# Patient Record
Sex: MALE | Race: WHITE
[De-identification: names, ages, dates, MRNs, and addresses within clinical notes are randomized per-mention and may not be internally consistent; named-entity substitution may affect disease eponyms.]

---

## 2019-06-21 ENCOUNTER — HOSPITAL ENCOUNTER (OUTPATIENT)
Dept: HOSPITAL 97 - ER | Age: 84
Setting detail: OBSERVATION
LOS: 2 days | Discharge: HOME | End: 2019-06-23
Attending: INTERNAL MEDICINE | Admitting: INTERNAL MEDICINE
Payer: COMMERCIAL

## 2019-06-21 VITALS — BODY MASS INDEX: 27.6 KG/M2

## 2019-06-21 DIAGNOSIS — I10: ICD-10-CM

## 2019-06-21 DIAGNOSIS — E78.5: ICD-10-CM

## 2019-06-21 DIAGNOSIS — I25.10: ICD-10-CM

## 2019-06-21 DIAGNOSIS — Z95.5: ICD-10-CM

## 2019-06-21 DIAGNOSIS — N17.9: ICD-10-CM

## 2019-06-21 DIAGNOSIS — S01.81XA: ICD-10-CM

## 2019-06-21 DIAGNOSIS — R55: Primary | ICD-10-CM
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
## 2019-06-21 DIAGNOSIS — Z23: ICD-10-CM
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
## 2019-06-21 LAB
BUN BLD-MCNC: 35 MG/DL (ref 7–18)
GLUCOSE SERPLBLD-MCNC: 143 MG/DL (ref 74–106)
HCT VFR BLD CALC: 39.5 % (ref 39.6–49)
LYMPHOCYTES # SPEC AUTO: 1.5 K/UL (ref 0.7–4.9)
PMV BLD: 7.6 FL (ref 7.6–11.3)
POTASSIUM SERPL-SCNC: 3.4 MMOL/L (ref 3.5–5.1)
RBC # BLD: 4.44 M/UL (ref 4.33–5.43)
TROPONIN (EMERG DEPT USE ONLY): < 0.02 NG/ML (ref 0–0.04)

## 2019-06-21 PROCEDURE — 70450 CT HEAD/BRAIN W/O DYE: CPT

## 2019-06-21 PROCEDURE — 90714 TD VACC NO PRESV 7 YRS+ IM: CPT

## 2019-06-21 PROCEDURE — 96360 HYDRATION IV INFUSION INIT: CPT

## 2019-06-21 PROCEDURE — 93005 ELECTROCARDIOGRAM TRACING: CPT

## 2019-06-21 PROCEDURE — 90471 IMMUNIZATION ADMIN: CPT

## 2019-06-21 PROCEDURE — 86900 BLOOD TYPING SEROLOGIC ABO: CPT

## 2019-06-21 PROCEDURE — 73080 X-RAY EXAM OF ELBOW: CPT

## 2019-06-21 PROCEDURE — 86901 BLOOD TYPING SEROLOGIC RH(D): CPT

## 2019-06-21 PROCEDURE — 72125 CT NECK SPINE W/O DYE: CPT

## 2019-06-21 PROCEDURE — 93880 EXTRACRANIAL BILAT STUDY: CPT

## 2019-06-21 PROCEDURE — 80048 BASIC METABOLIC PNL TOTAL CA: CPT

## 2019-06-21 PROCEDURE — 71250 CT THORAX DX C-: CPT

## 2019-06-21 PROCEDURE — 81003 URINALYSIS AUTO W/O SCOPE: CPT

## 2019-06-21 PROCEDURE — 96361 HYDRATE IV INFUSION ADD-ON: CPT

## 2019-06-21 PROCEDURE — 85025 COMPLETE CBC W/AUTO DIFF WBC: CPT

## 2019-06-21 PROCEDURE — 73502 X-RAY EXAM HIP UNI 2-3 VIEWS: CPT

## 2019-06-21 PROCEDURE — 73110 X-RAY EXAM OF WRIST: CPT

## 2019-06-21 PROCEDURE — 99285 EMERGENCY DEPT VISIT HI MDM: CPT

## 2019-06-21 PROCEDURE — 97163 PT EVAL HIGH COMPLEX 45 MIN: CPT

## 2019-06-21 PROCEDURE — 36415 COLL VENOUS BLD VENIPUNCTURE: CPT

## 2019-06-21 PROCEDURE — 0JQ10ZZ REPAIR FACE SUBCUTANEOUS TISSUE AND FASCIA, OPEN APPROACH: ICD-10-PCS

## 2019-06-21 PROCEDURE — 86850 RBC ANTIBODY SCREEN: CPT

## 2019-06-21 PROCEDURE — 12011 RPR F/E/E/N/L/M 2.5 CM/<: CPT

## 2019-06-21 PROCEDURE — 84484 ASSAY OF TROPONIN QUANT: CPT

## 2019-06-21 NOTE — P.HP
Certification for Inpatient


Patient admitted to: Observation


With expected LOS: <2 Midnights


Practitioner: I am a practitioner with admitting privileges, knowledge of 

patient current condition, hospital course, and medical plan of care.


Services: Services provided to patient in accordance with Admission 

requirements found in Title 42 Section 412.3 of the Code of Federal Regulations





Patient History


Date of Service: 06/21/19


Reason for admission: syncope, fall


History of Present Illness: 





Mr Jurado is an 87 years old male with history of CAD, HTN, dyslipidemia, PVD, 

BPH, who [place] today. He was trying to get 

popcorn out of the cabinet, when suddenly he fell and hit his left side of the 

head to the ground. The patient denied dizziness or palpitation prior to fall. 

He did not loss his conscious, but he does not remember how did it happened. He 

denied chest pain or SOB. Wife and son said that he is not very steady in his 

feet, and he may tripped. EKG shows SR at 65 bpm, trop I negative. CT head/

cervical/chest/abd/pelvis showed no acute abnormalities. EKG SR at 65 with T 

wave inversion on inferior leads.


Home medications list reviewed: Yes





- Past Medical/Surgical History


-: Hypertension; Atrial Fib; Glaucoma; BPH; Depression; High Cholesterol


-: cardiac stents; back surgery; Knee surgery; shoulder surgery; Carpal Tunnel





- Family History


Family History: Reviewed- Non-Contributory





- Social History


Smoking Status: Former smoker


Alcohol use: Yes


CD- Drugs: No


Place of Residence: Home





Review of Systems


10-point ROS is otherwise unremarkable





Physical Examination





- Physical Exam


General: Alert, In no apparent distress, Oriented x3


HEENT: PERRLA, Mucous membr. moist/pink, Other (left periorbital hematoma), EOMI

, Sclerae nonicteric


Neck: Supple, 2+ carotid pulse no bruit, No LAD, Without JVD or thyroid 

abnormality


Respiratory: Clear to auscultation bilaterally, Normal air movement


Cardiovascular: Regular rate/rhythm, Normal S1 S2


Gastrointestinal: Normal bowel sounds, No tenderness


Musculoskeletal: No tenderness


Integumentary: No rashes, Skin lesion (left arm)


Neurological: Normal speech, Normal strength at 5/5 x4 extr, Normal tone, 

Normal affect


Lymphatics: No axilla or inguinal lymphadenopathy





- Studies


Laboratory Data (last 24 hrs)





06/21/19 21:20: Creatinine 1.59 H


06/21/19 21:20: WBC 8.9, Hgb 13.1 L, Hct 39.5 L, Plt Count 223


06/21/19 21:20: Sodium 139, Potassium 3.4 L, BUN 35 H, Creatinine 1.65 H, 

Glucose 143 H








Assessment and Plan





- Problems (Diagnosis)


(1) Syncope


Current Visit: Yes   Status: Acute   


Qualifiers: 


   Syncope type: unspecified   Qualified Code(s): R55 - Syncope and collapse   





(2) HTN (hypertension)


Current Visit: Yes   Status: Acute   


Qualifiers: 


   Hypertension type: essential hypertension   Qualified Code(s): I10 - 

Essential (primary) hypertension   





(3) CAD (coronary artery disease)


Current Visit: Yes   Status: Acute   


Qualifiers: 


   Coronary Disease-Associated Artery/Lesion type: native artery   Native vs. 

transplanted heart: native heart   Associated angina: without angina   

Qualified Code(s): I25.10 - Atherosclerotic heart disease of native coronary 

artery without angina pectoris   





(4) Acute renal injury


Current Visit: Yes   Status: Acute   





- Plan





will admit the patient under observation due to a fall at home. Differential 

diagnosis include syncope vs tripped and fall. Will order continue cardiac 

monitor, ECHO. Consult PT for evaluation and recommendations.





- Advance Directives


Does patient have a Living Will: No


Does patient have a Durable POA for Healthcare: No





- Code Status/Comfort Care


Code Status Assessed: Yes


Code Status: Full Code

## 2019-06-21 NOTE — ER
Nurse's Notes                                                                                     

 [facility]                                                                 

Name: Yvonne Jurado                                                                                

Age: 87 yrs                                                                                       

Sex: Male                                                                                         

: 1931                                                                                   

MRN: [id number]                                                                                   

Arrival Date: 2019                                                                          

Time: 20:51                                                                                       

Account#: [id number]                                                                            

Bed 6                                                                                             

Private MD:                                                                                       

Diagnosis: Syncope and collapse                                                                   

                                                                                                  

Presentation:                                                                                     

                                                                                             

20:53 Presenting complaint: Patient states: I was getting popcorn out of the cabinet and all  tl1 

      of a sudden I tripped over my feet and hit the left side of my head on the floor and        

      also hit my left elbow, left wrist and left hip. Care prior to arrival: dressing            

      applied to wound to left forehead and left elbow. Mechanism of Injury: Fall. Trauma         

      event details: Injury occurred in the [facility], Injury occurred: at home.         

      Injury occurred: 2019 Injury occurred at: 19:30.                                   

20:53 Acuity: MARSHA 2                                                                           tl1 

20:53 Method Of Arrival: EMS: [facility]                                                    tl1 

23:47 Transition of care: patient was not received from another setting of care. Onset of     tl1 

      symptoms was 2019. Risk Assessment: Do you want to hurt yourself or someone        

      else? Patient reports no desire to harm self or others. Initial Sepsis Screen: Does the     

      patient meet any 2 criteria? No. Patient's initial sepsis screen is negative. Does the      

      patient have a suspected source of infection? No. Patient's initial sepsis screen is        

      negative.                                                                                   

                                                                                                  

Triage Assessment:                                                                                

21:02 General: Appears in no apparent distress. Behavior is calm, cooperative, appropriate    tl1 

      for age. Pain: Complains of pain in left temple, left elbow, left wrist, left hip Pain      

      currently is 4 out of 10 on a pain scale. Quality of pain is described as aching.           

      Neuro: Level of Consciousness is awake, alert, obeys commands, Oriented to person,          

      place, time, situation,  are equal bilaterally Speech is normal, Pupils are            

      PERRLA. Cardiovascular: Denies chest pain. Respiratory: Airway is patent Trachea            

      midline Respiratory effort is even, unlabored, Respiratory pattern is regular,              

      symmetrical, Breath sounds are clear bilaterally. GI: Abdomen is non-distended, Bowel       

      sounds present X 4 quads. Abd is soft and non tender X 4 quads. : No signs and/or         

      symptoms were reported regarding the genitourinary system. Derm: Skin is thin, Skin is      

      pink, warm \T\ dry. Skin temperature is warm. Musculoskeletal: Tenderness present in left   

      hip Reports pain in left hip. Injury Description: Head injury sustained to left temple      

      is open, bleeding, did not have loss of consciousness, was sustained 1-2 hours ago.         

      Laceration sustained to left temple is clean, 2.6 to 7.5 cm long, was sustained 1-2         

      hours ago. moderate bleeding noted at this time. skin tear noted to left elbow and left     

      wrist.                                                                                      

                                                                                                  

Trauma Activation: Alert                                                                          

 Physician: ED Physician; Name: Dr Gurrola; Notified At:  20:43; Arrived At:            

   20:43                                                                                

 Physician: General Surgeon; Name: ; Notified At:  20:43; Arrived At:                   

 Physician: Radiology; Name: Wayne Contreras Lindsy; Notified At:  20:43;              

  Arrived At:  20:45                                                                    

 Physician: Respiratory; Name: ; Notified At:  20:43; Arrived At:                       

 Physician: Lab; Name: ; Notified At:  20:43; Arrived At:                               

                                                                                                  

Historical:                                                                                       

- Allergies:                                                                                      

21:02 No Known Allergies;                                                                     tl1 

- Home Meds:                                                                                      

21:17 amlodipine 10 mg tab 1 tab once daily [Active]; atorvastatin 80 mg oral tab 1 tab once  aa1 

      daily [Active]; Plavix 75 mg Oral tab 1 tab once daily [Active]; escitalopram oxalate 5     

      mg oral tab 1 tab once daily [Active]; finasteride 5 mg oral tab 1 tab once daily           

      [Active]; furosemide 40 mg Oral tab 1 tab 2 times per day [Active]; pantoprazole 40 mg      

      oral TbEC 1 tab once daily [Active]; propafenone 225 mg Oral tab 1 tab every 8 hours        

      [Active]; Combigan 0.2-0.5 % ophthalmic drop 2 drop three times a day [Active];             

      tamsulosin 0.4 mg oral cp24 1 cap once daily [Active]; isosorbide mononitrate 60 mg         

      Oral Tb24 1 tab once daily [Active]; aspirin 81 mg Oral TbEC 1 tab once daily [Active];     

      Iron CR Oral 65 mg daily [Active]; omega-3 fatty acids 1,000 mg oral cap twice a day        

      [Active];                                                                                   

- PMHx:                                                                                           

21:02 Hypertension; Atrial Fib; Glaucoma; BPH; Depression; High Cholesterol;                  tl1 

- PSHx:                                                                                           

21:02 cardiac stents; back surgery; Knee surgery; shoulder surgery; Carpal Tunnel Repair;     tl1 

      finger amputation;                                                                          

                                                                                                  

- Immunization history: Last tetanus immunization: unknown.                                       

- Social history:: Patient uses alcohol, Patient/guardian denies using street drugs, IV           

  drugs, The patient lives with family, Smoking status: Patient/guardian denies using             

  tobacco, the patient reports quitting approximately 30 years ago.                               

- Family history:: not pertinent.                                                                 

- Ebola Screening: : Patient negative for fever greater than or equal to 101.5 degrees            

  Fahrenheit, and additional compatible Ebola Virus Disease symptoms Patient denies               

  exposure to infectious person Patient denies travel to an Ebola-affected area in the            

  21 days before illness onset.                                                                   

- Social history: Uses alcohol, glass of wine daily. Denies using tobacco products.               

                                                                                                  

                                                                                                  

Screenin:26 Abuse screen: Denies threats or abuse. Tuberculosis screening: No symptoms or risk      tl1 

      factors identified.                                                                         

23:47 Nutritional screening: No deficits noted. Fall Risk Fall in past 12 months (25 points). tl1 

      IV access (20 points).                                                                      

                                                                                                  

Primary Survey:                                                                                   

21:07 NO uncontrolled hemorrhage observed. A: The patient is alert. Airway: patent.           tl1 

      Breathing/Chest: Respiratory pattern: regular, Respiratory effort: spontaneous,             

      unlabored, Breath sounds: clear, bilaterally. Circulation: Pulses: palpable . Skin          

      color: pink, Skin temperature: warm. Disability Alert. Exposure/Environment: All            

      clothing and personal items were removed. Forensic evidence collection is not deemed to     

      be indicated at this time. Items placed in patient belonging bag. There is no evidence      

      of uncontrolled external bleeding. Obvious injury(ies) are noted at this time:              

      laceration noted to left temple, skin tear noted to left wrist and left elbow.              

      Reassessment Airway Airway Patent Breathing/Chest Respiratory pattern Regular               

      Respiratory effort Spontaneous Unlabored Breath sounds Clear Chest inspection               

      Symmetrical Circulation Color Pink Temperature Warm Dry Disability Alert.                   

                                                                                                  

Secondary Survey:                                                                                 

21:25 HEENT: Head Other laceration to left temple Face No injury/deformity Eyes: No injury or tl1 

      deformity noted. to bilateral eyes. Ears: clear bilaterally. Nose: clear to bilateral       

      nares. Throat: No injury or deformity noted. Gastrointestinal: No deficits noted. :       

      No deficits noted. Musculoskeletal: Tenderness present in left hip Reports pain in left     

      hip. Injury Description: Head injury sustained to left temple is open, bleeding, did        

      not have loss of consciousness, was sustained 1-2 hours ago. Laceration sustained to        

      left temple.                                                                                

                                                                                                  

Assessment:                                                                                       

21:52 Reassessment: see triage assessment.                                                    tl1 

23:25 Reassessment: Patient and/or family updated on plan of care and expected duration. Pain tl1 

      level reassessed. Patient is alert, oriented x 3, equal unlabored respirations, skin        

      warm/dry/pink. Patient states feeling better. Patient states symptoms have improved.        

      General: Appears in no apparent distress. comfortable, Behavior is calm, cooperative,       

      appropriate for age. Pain: Denies pain. Neuro: Level of Consciousness is awake, alert,      

      obeys commands, Oriented to person, place, time, situation, Speech is normal, Pupils        

      are PERRLA. Cardiovascular: Denies chest pain. Respiratory: Airway is patent Trachea        

      midline Respiratory effort is even, unlabored, Breath sounds are clear bilaterally. GI:     

      Abdomen is non-distended, Bowel sounds present X 4 quads. : No signs and/or symptoms      

      were reported regarding the genitourinary system. Derm: Bruising that is dark purple,       

      on left eye and left temple.                                                                

                                                                                                  

Vital Signs:                                                                                      

20:57  / 82; Pulse 87; Resp 17; Temp 98.1(O); Pulse Ox 100% ; Weight 87.09 kg; Height 5 tl1 

      ft. 10 in. (177.80 cm); Pain 2/10;                                                          

23:09  / 61; Pulse 67; Resp 16; Pulse Ox 99% ; Pain 2/10;                               tl1 

23:45  / 72; Pulse 82; Resp 16; Temp 98.2; Pulse Ox 100% on R/A; Pain 5/10;             tl1 

20:57 Body Mass Index 27.55 (87.09 kg, 177.80 cm)                                             tl1 

                                                                                                  

[place] Coma Score:                                                                               

20:57 Eye Response: spontaneous(4). Verbal Response: oriented(5). Motor Response: obeys       tl1 

      commands(6). Total: 15.                                                                     

22:00 Eye Response: spontaneous(4). Verbal Response: oriented(5). Motor Response: obeys       tl1 

      commands(6). Total: 15.                                                                     

23:46 Eye Response: spontaneous(4). Verbal Response: oriented(5). Motor Response: obeys       tl1 

      commands(6). Total: 15.                                                                     

                                                                                                  

Trauma Score (Adult):                                                                             

20:57 Eye Response: spontaneous(1); Verbal Response: oriented(1); Motor Response: obeys       tl1 

      commands(2); Systolic BP: > 89 mm Hg(4); Respiratory Rate: 10 to 29 per min(4); [place]     

      Score: 15; Trauma Score: 12                                                                 

22:00 Eye Response: spontaneous(1); Verbal Response: oriented(1); Motor Response: obeys       tl1 

      commands(2); Systolic BP: > 89 mm Hg(4); Respiratory Rate: 10 to 29 per min(4); [place]     

      Score: 15; Trauma Score: 12                                                                 

23:46 Eye Response: spontaneous(1); Verbal Response: oriented(1); Motor Response: obeys       tl1 

      commands(2); Systolic BP: > 89 mm Hg(4); Respiratory Rate: 10 to 29 per min(4); [place]     

      Score: 15; Trauma Score: 12                                                                 

                                                                                                  

ED Course:                                                                                        

20:51 Patient arrived in ED.                                                                  tl1 

20:52 Dejah Parry, NAOMIE is Primary Nurse.                                                    tl1 

20:57 Triage completed.                                                                       tl1 

21:06 Isela Gurrola MD is Attending Physician.                                           ma2 

21:06 Arm band placed on right wrist.                                                         tl1 

21:06 Patient has correct armband on for positive identification. Placed in gown. Bed in low  tl1 

      position. Call light in reach. Side rails up X 1. Adult w/ patient. Pulse ox on. NIBP       

      on. Warm blanket given.                                                                     

21:32 Inserted saline lock: 20 gauge in right wrist, using aseptic technique. Blood collected.ag4 

21:50 Assist provider with laceration repair on left temple that was between 2.6 to 7.5 cm    tl1 

      using sutures. Set up tray. Performed by Isela Gurrola MD Dressed with 4X4s, Christie.      

21:53 Elbow Left 3 View XRAY In Process Unspecified.                                          EDMS

21:53 Wrist Left (3 View) XRAY In Process Unspecified.                                        EDMS

21:54 Hip Left 2 View In Process Unspecified.                                                 EDMS

22:00 CT Traumagram (Head C Spine CAP wo con) In Process Unspecified.                         EDMS

23:07 Wound care: to skin tear to left elbow located on left elbow was cleaned with           tl1 

      Hibiclens, irrigated with normal saline, dressed with 4X4s, steri strips.                   

23:24 Teresita Chavez MD is Hospitalizing Provider.                                          ma2 

23:48 Patient maintains SpO2 saturation greater than 95% on room air. Thermoregulation: warm  tl1 

      blanket given to patient.                                                                   

                                                                                             

00:09 Patient admitted, IV remains in place.                                                  tl1 

                                                                                                  

Administered Medications:                                                                         

                                                                                             

21:15 Drug: Tetanus-Diphtheria Toxoid Adult 0.5 ml {: [employer]. Exp:         tl1 

      2021. Lot #: [id number]. } Route: IM; Site: left deltoid;                                 

23:28 Follow up: Response: No adverse reaction; No change in condition                        tl1 

22:23 Drug: NS 0.9% 1000 ml Route: IV; Rate: 1 bolus; Site: left forearm;                     tl1 

                                                                                             

00:07 Follow up: IV Status: Completed infusion                                                tl1 

                                                                                             

22:23 Drug: Potassium Chloride 40 mEq Route: PO;                                              tl1 

23:28 Follow up: Response: No adverse reaction; No change in condition                        tl1 

23:43 Drug: traMADol 50 mg Route: PO;                                                         tl1 

                                                                                             

00:07 Follow up: Response: No adverse reaction; No change in condition; Other; medication     tl1 

      administerde just prior to admit                                                            

                                                                                                  

                                                                                                  

Intake:                                                                                           

                                                                                             

23:53 IV: 1000ml; Total: 1000ml.                                                              tl1 

                                                                                                  

Output:                                                                                           

23:53 Urine: 400ml (Voided); Total: 400ml.                                                    tl1 

                                                                                                  

Outcome:                                                                                          

23:25 Decision to Hospitalize by Provider.                                                    ma2 

23:48 Patient's length of stay in the Emergency Department was greater than 2 hours. Patient  tl1 

      being admittedPatient's length of stay extended due to                                      

                                                                                             

00:08 Admitted to Tele accompanied by tech, via stretcher, with chart, Report called to       tl1 

      Keysha AKBAR                                                                                  

      Condition: stable                                                                           

      Instructed on the need for admit.                                                           

00:36 Patient left the ED.                                                                    tl1 

                                                                                                  

Signatures:                                                                                       

Dispatcher [employer]                           EDMS                                                 

Kathi Jane RN                  RN   aa1                                                  

Lakesha Mckeon RN                     RN   bb                                                   

Dejah Parry RN                      RN   tl1                                                  

Isela Gurrola MD MD ma2                                                  

Denzel Llanos                                 ag4                                                  

                                                                                                  

Corrections: (The following items were deleted from the chart)                                    

                                                                                             

21:10 20:53 Trauma Activation: Alert tl1                                                      bb  

23:53 23:23 IV 1000, Intake Total 1000; Urine 400, (Voided), Output Total 400. tl1            tl1 

                                                                                                  

**************************************************************************************************

## 2019-06-21 NOTE — EDPHYS
Physician Documentation                                                                           

 [facility]                                                                 

Name: Yvonne Jurado                                                                                

Age: 87 yrs                                                                                       

Sex: Male                                                                                         

: 1931                                                                                   

MRN: [id number]                                                                                   

Arrival Date: 2019                                                                          

Time: 20:51                                                                                       

Account#: [id number]                                                                            

Bed 6                                                                                             

Private MD:                                                                                       

ED Physician Isela Gurrola                                                                    

HPI:                                                                                              

                                                                                             

23:19 This 87 yrs old  Male presents to ER via EMS with complaints of Fall Injury,   ma2 

      Head Injury Without LOC-Adult.                                                              

23:20 Details of fall: The patient fell from a supine position. Onset: The symptoms/episode   ma2 

      began/occurred suddenly, 1 day(s) ago. Associated injuries: The patient sustained           

      injury to the head. Severity of symptoms: At their worst the symptoms were mild, in the     

      emergency department the symptoms are unchanged. The patient has not experienced            

      similar symptoms in the past. ?syncope, declined pain medicine in er .                      

                                                                                                  

Historical:                                                                                       

- Allergies:                                                                                      

21:02 No Known Allergies;                                                                     tl1 

- Home Meds:                                                                                      

21:17 amlodipine 10 mg tab 1 tab once daily [Active]; atorvastatin 80 mg oral tab 1 tab once  aa1 

      daily [Active]; Plavix 75 mg Oral tab 1 tab once daily [Active]; escitalopram oxalate 5     

      mg oral tab 1 tab once daily [Active]; finasteride 5 mg oral tab 1 tab once daily           

      [Active]; furosemide 40 mg Oral tab 1 tab 2 times per day [Active]; pantoprazole 40 mg      

      oral TbEC 1 tab once daily [Active]; propafenone 225 mg Oral tab 1 tab every 8 hours        

      [Active]; Combigan 0.2-0.5 % ophthalmic drop 2 drop three times a day [Active];             

      tamsulosin 0.4 mg oral cp24 1 cap once daily [Active]; isosorbide mononitrate 60 mg         

      Oral Tb24 1 tab once daily [Active]; aspirin 81 mg Oral TbEC 1 tab once daily [Active];     

      Iron CR Oral 65 mg daily [Active]; omega-3 fatty acids 1,000 mg oral cap twice a day        

      [Active];                                                                                   

- PMHx:                                                                                           

21:02 Hypertension; Atrial Fib; Glaucoma; BPH; Depression; High Cholesterol;                  tl1 

- PSHx:                                                                                           

21:02 cardiac stents; back surgery; Knee surgery; shoulder surgery; Carpal Tunnel Repair;     tl1 

      finger amputation;                                                                          

                                                                                                  

- Immunization history: Last tetanus immunization: unknown.                                       

- Social history:: Patient uses alcohol, Patient/guardian denies using street drugs, IV           

  drugs, The patient lives with family, Smoking status: Patient/guardian denies using             

  tobacco, the patient reports quitting approximately 30 years ago.                               

- Family history:: not pertinent.                                                                 

- Ebola Screening: : Patient negative for fever greater than or equal to 101.5 degrees            

  Fahrenheit, and additional compatible Ebola Virus Disease symptoms Patient denies               

  exposure to infectious person Patient denies travel to an Ebola-affected area in the            

  21 days before illness onset.                                                                   

- Social history: Uses alcohol, glass of wine daily. Denies using tobacco products.               

                                                                                                  

                                                                                                  

ROS:                                                                                              

23:20 Constitutional: Negative for fever, chills, and weight loss, Cardiovascular: Negative   ma2 

      for chest pain, palpitations, and edema, Respiratory: Negative for shortness of breath,     

      cough, wheezing, and pleuritic chest pain, Abdomen/GI: Negative for abdominal pain,         

      nausea, diarrhea, and constipation, Back: Negative for injury and pain, : Negative        

      for injury, bleeding, discharge, and swelling, MS/Extremity: Negative for injury and        

      deformity, Skin: Negative for injury, rash, and discoloration, Neuro: Negative for          

      headache, weakness, numbness, tingling, and seizure.                                        

                                                                                                  

Exam:                                                                                             

23:20 Constitutional:  This is a well developed, well nourished patient who is awake, alert,  ma2 

      and in no acute distress. Chest/axilla:  Normal chest wall appearance and motion.           

      Nontender with no deformity.  No lesions are appreciated. Cardiovascular:  Regular rate     

      and rhythm with a normal S1 and S2.  No gallops, murmurs, or rubs.  Normal PMI, no JVD.     

       No pulse deficits. Respiratory:  Lungs have equal breath sounds bilaterally, clear to      

      auscultation and percussion.  No rales, rhonchi or wheezes noted.  No increased work of     

      breathing, no retractions or nasal flaring. Abdomen/GI:  Soft, non-tender, with normal      

      bowel sounds.  No distension or tympany.  No guarding or rebound.  No evidence of           

      tenderness throughout.                                                                      

23:20 MS/ Extremity:  Pulses equal, no cyanosis.  Neurovascular intact.  Full, normal range       

      of motion. Neuro:  Awake and alert, GCS 15, oriented to person, place, time, and            

      situation.  Cranial nerves II-XII grossly intact.  Motor strength 5/5 in all                

      extremities.  Sensory grossly intact.  Cerebellar exam normal.  Normal gait.                

23:20 Head/face: Noted is contusion, laceration.                                                  

23:20 Musculoskeletal/extremity: left elbow abrasion with skin loss .                         ma2 

                                                                                                  

Vital Signs:                                                                                      

20:57  / 82; Pulse 87; Resp 17; Temp 98.1(O); Pulse Ox 100% ; Weight 87.09 kg; Height 5 tl1 

      ft. 10 in. (177.80 cm); Pain 2/10;                                                          

23:09  / 61; Pulse 67; Resp 16; Pulse Ox 99% ; Pain 2/10;                               tl1 

23:45  / 72; Pulse 82; Resp 16; Temp 98.2; Pulse Ox 100% on R/A; Pain 5/10;             tl1 

20:57 Body Mass Index 27.55 (87.09 kg, 177.80 cm)                                             tl1 

                                                                                                  

Leland Coma Score:                                                                               

20:57 Eye Response: spontaneous(4). Verbal Response: oriented(5). Motor Response: obeys       tl1 

      commands(6). Total: 15.                                                                     

22:00 Eye Response: spontaneous(4). Verbal Response: oriented(5). Motor Response: obeys       tl1 

      commands(6). Total: 15.                                                                     

23:46 Eye Response: spontaneous(4). Verbal Response: oriented(5). Motor Response: obeys       tl1 

      commands(6). Total: 15.                                                                     

                                                                                                  

Trauma Score (Adult):                                                                             

20:57 Eye Response: spontaneous(1); Verbal Response: oriented(1); Motor Response: obeys       tl1 

      commands(2); Systolic BP: > 89 mm Hg(4); Respiratory Rate: 10 to 29 per min(4); Leland     

      Score: 15; Trauma Score: 12                                                                 

22:00 Eye Response: spontaneous(1); Verbal Response: oriented(1); Motor Response: obeys       tl1 

      commands(2); Systolic BP: > 89 mm Hg(4); Respiratory Rate: 10 to 29 per min(4); Leland     

      Score: 15; Trauma Score: 12                                                                 

23:46 Eye Response: spontaneous(1); Verbal Response: oriented(1); Motor Response: obeys       tl1 

      commands(2); Systolic BP: > 89 mm Hg(4); Respiratory Rate: 10 to 29 per min(4); Mario     

      Score: 15; Trauma Score: 12                                                                 

                                                                                                  

Laceration:                                                                                       

23:20 Wound Repair of 2cm ( 0.8in ) subcutaneous laceration to face. Linear shaped.. Distal   ma2 

      neuro/vascular/tendon intact. Anesthesia: Local anesthetic administered with 2 mls of       

      1% lidocaine w/ Epi. Wound prep: Moderate cleansing. Skin closed with 4 4-0 Prolene         

      using simple sutures and sterile technique. Dressed with 4x4's.                             

                                                                                                  

MDM:                                                                                              

21:07 Patient medically screened.                                                             ma2 

22:35 Patient medically screened.                                                             ma2 

23:20 Differential diagnosis: abrasion, closed head injury, contusion, laceration. Data       ma2 

      reviewed: vital signs, nurses notes. Counseling: I had a detailed discussion with the       

      patient and/or guardian regarding: the historical points, exam findings, and any            

      diagnostic results supporting the discharge/admit diagnosis, the presence of at least       

      one elevated blood pressure reading (>120/80) during this emergency department visit,       

      the need for further work-up and treatment in the hospital. Response to treatment: the      

      patient's symptoms have resolved after treatment.                                           

                                                                                                  

                                                                                             

21:08 Order name: Basic Metabolic Panel; Complete Time: 22:01                                 [facility] 

                                                                                             

21:08 Order name: CBC with Diff; Complete Time: 22:01                                         [facility] 

                                                                                             

21:08 Order name: CT Traumagram (Head C Spine CAP wo con)                                     [facility] 

                                                                                             

21:08 Order name: Creatinine for Radiology; Complete Time: 22:01                              [facility] 

                                                                                             

21:08 Order name: Type And Screen                                                             [facility] 

                                                                                             

21:08 Order name: Troponin (emerg Dept Use Only); Complete Time: 22:01                        [facility] 

                                                                                             

21:08 Order name: Elbow Left 3 View XRAY                                                      [facility] 

                                                                                             

21:08 Order name: Wrist Left (3 View) XRAY                                                    [facility] 

                                                                                             

21:41 Order name: Hip Left 2 View                                                             [facility]

                                                                                             

23:15 Order name: EKG Electrocardiogram; Complete Time: 23:28                                 [facility]

                                                                                             

21:08 Order name: Labs collected and sent; Complete Time: 21:32                               [facility] 

                                                                                                  

Administered Medications:                                                                         

21:15 Drug: Tetanus-Diphtheria Toxoid Adult 0.5 ml {: Meteor. Exp:         tl1 

      2021. Lot #: [id number]. } Route: IM; Site: left deltoid;                                 

23:28 Follow up: Response: No adverse reaction; No change in condition                        tl1 

22:23 Drug: NS 0.9% 1000 ml Route: IV; Rate: 1 bolus; Site: left forearm;                     tl1 

                                                                                             

00:07 Follow up: IV Status: Completed infusion                                                tl1 

                                                                                             

22:23 Drug: Potassium Chloride 40 mEq Route: PO;                                              tl1 

23:28 Follow up: Response: No adverse reaction; No change in condition                        tl1 

23:43 Drug: traMADol 50 mg Route: PO;                                                         tl1 

                                                                                             

00:07 Follow up: Response: No adverse reaction; No change in condition; Other; medication     tl1 

      administerde just prior to admit                                                            

                                                                                                  

                                                                                                  

Disposition:                                                                                      

19 23:25 Hospitalization ordered by Teresita Chavez for Observation. Preliminary            

  diagnosis is Syncope and collapse.                                                              

- Bed requested for Telemetry/MedSurg (observation).                                              

- Status is Observation.                                                                      tl1 

- Condition is Stable.                                                                            

- Problem is new.                                                                                 

- Symptoms are unchanged.                                                                         

UTI on Admission? No                                                                              

                                                                                                  

                                                                                                  

                                                                                                  

Signatures:                                                                                       

Dispatcher [employer]                           EDMS                                                 

Carey Herbert RN RN                                                      

Kathi Jane RN                  RN   1                                                  

Dejah Parry RN RN   1                                                  

Isela Gurrola MD MD   ma2                                                  

                                                                                                  

Corrections: (The following items were deleted from the chart)                                    

                                                                                             

23:41 23:25 Hospitalization Ordered by Teresita Chavez MD for Observation. Preliminary           

      diagnosis is Syncope and collapse. Bed requested for Telemetry/MedSurg (observation).       

      Status is Observation. Condition is Stable. Problem is new. Symptoms are unchanged. UTI     

      on Admission? No. ma2                                                                       

                                                                                             

00:36  23:41 2019 23:25 Hospitalization Ordered by Teresita Chavez MD for           tl1 

      Observation. Preliminary diagnosis is Syncope and collapse. Bed requested for               

      Telemetry/MedSurg (observation). Status is Observation. Condition is Stable. Problem is     

      new. Symptoms are unchanged. UTI on Admission? No. mw                                       

                                                                                                  

**************************************************************************************************

## 2019-06-22 LAB
BUN BLD-MCNC: 32 MG/DL (ref 7–18)
GLUCOSE SERPLBLD-MCNC: 117 MG/DL (ref 74–106)
HCT VFR BLD CALC: 30.9 % (ref 39.6–49)
LYMPHOCYTES # SPEC AUTO: 1.1 K/UL (ref 0.7–4.9)
PMV BLD: 7.7 FL (ref 7.6–11.3)
POTASSIUM SERPL-SCNC: 3.8 MMOL/L (ref 3.5–5.1)
RBC # BLD: 3.47 M/UL (ref 4.33–5.43)
UA DIPSTICK W REFLEX MICRO PNL UR: (no result)

## 2019-06-22 RX ADMIN — Medication SCH ML: at 15:04

## 2019-06-22 RX ADMIN — Medication SCH ML: at 21:19

## 2019-06-22 RX ADMIN — PROPAFENONE HYDROCHLORIDE SCH MG: 150 TABLET, COATED ORAL at 15:03

## 2019-06-22 RX ADMIN — PROPAFENONE HYDROCHLORIDE SCH MG: 150 TABLET, COATED ORAL at 21:18

## 2019-06-22 NOTE — RAD REPORT
EXAM DESCRIPTION:  US - CP - 6/22/2019 11:47 am

 

COMPARISON:  Syncope

 

TECHNIQUE:  Real-time sonographic evaluation of both carotid systems was performed. Gray scale and Do
ppler interrogation were performed with waveform tracing bilaterally.

 

FINDINGS:  Normal high resistance waveforms are noted in both external carotid arteries. The common c
arotid arteries and internal carotid arteries show normal low resistance waveforms.

 

Mild plaquing changes are seen in the distal right common carotid artery. More prominent plaquing ines
nges present in the distal and midportion of the left common carotid artery. On visual inspection no 
significant degree of luminal narrowing seen. Plaquing changes extend into the left carotid bulb. Pea
k systolic and end diastolic velocity values and the ICA/CCA ratios are in the non-hemodynamically si
gnificant range.

 

Antegrade flow seen in both vertebral arteries.

 

Velocity values and ratios were recorded and are retained in the patient's imaging records.

 

 

IMPRESSION:  Bilateral calcified and noncalcified plaquing changes are present. On visual inspection 
no significant luminal narrowing seen.

 

Velocity values and ratios indicate no significant degree of stenosis.

## 2019-06-22 NOTE — P.PN
Subjective


Date of Service: 06/22/19


Chief Complaint: syncope, fall





PATIENT SEEN AND EXAMINED AT BEDSIDE WITH RN.  CHART REVIEWED.  THIS MORNING 

PATIENT HAS BEEN DOING WELL OVERALL.  NO COMPLAINTS TO OFFER.  HAS BEEN GETTING 

UP IN AND OUT OF BED WITH WIFE.





Review of Systems


10-point ROS is otherwise unremarkable





Physical Examination





- Vital Signs


Temperature: 97.3 F


Blood Pressure: 152/67


Pulse: 70


Respirations: 18


Pulse Ox (%): 92





- Physical Exam


General: Alert, In no apparent distress


HEENT: Other (Bruising noted on the left.)


Neck: Supple, JVD not distended


Respiratory: Clear to auscultation bilaterally, Normal air movement


Cardiovascular: Regular rate/rhythm, Normal S1 S2


Gastrointestinal: Normal bowel sounds, No tenderness


Musculoskeletal: No tenderness


Integumentary: No rashes


Neurological: Normal speech, Normal tone, Normal affect


Lymphatics: No axilla or inguinal lymphadenopathy





- Studies


Laboratory Data (last 24 hrs)





06/21/19 21:20: Creatinine 1.59 H


06/21/19 21:20: WBC 8.9, Hgb 13.1 L, Hct 39.5 L, Plt Count 223


06/21/19 21:20: Sodium 139, Potassium 3.4 L, BUN 35 H, Creatinine 1.65 H, 

Glucose 143 H





Medications List Reviewed: Yes





Assessment And Plan





- Current Problems (Diagnosis)


(1) Syncope


Current Visit: Yes   Status: Acute   


Plan: 


Acute syncopal episode most likely secondary to vasovagal versus cardiogenic


-echocardiogram, carotid Doppler and brain MRI pending at this time


-cardiology consulted awaiting recommendations at this time


-patient orthostatics vitals are positive at this time


-PTOT consulted as well


-will await results from echocardiogram and brain MRI for further treatment.


Qualifiers: 


   Syncope type: unspecified   Qualified Code(s): R55 - Syncope and collapse   





(2) Acute renal injury


Current Visit: Yes   Status: Acute   


Plan: 


Patient initially with HANSEL.  Initial BUN and creatinine elevated.


-BUN creatinine improved today with IV fluids











(3) CAD (coronary artery disease)


Current Visit: Yes   Status: Chronic   


Qualifiers: 


   Coronary Disease-Associated Artery/Lesion type: native artery   Native vs. 

transplanted heart: native heart   Associated angina: without angina   

Qualified Code(s): I25.10 - Atherosclerotic heart disease of native coronary 

artery without angina pectoris   





(4) HTN (hypertension)


Current Visit: Yes   Status: Chronic   


Qualifiers: 


   Hypertension type: essential hypertension   Qualified Code(s): I10 - 

Essential (primary) hypertension   





- Plan





Patient pending clinical improvement at this time.  Will await echo and brain 

MRI at this time as well.


Discharge Plan: Home


Plan to discharge in: Greater than 2 days





- Code Status/Comfort Care


Code Status Assessed: Yes


Critical Care: No

## 2019-06-22 NOTE — EKG
Test Date:    2019-06-21               Test Time:    23:33:08

Technician:   AG3                                    

                                                     

MEASUREMENT RESULTS:                                       

Intervals:                                           

Rate:         65                                     

DC:           218                                    

QRSD:         114                                    

QT:           430                                    

QTc:          447                                    

Axis:                                                

P:            75                                     

DC:           218                                    

QRS:          83                                     

T:            -6                                     

                                                     

INTERPRETIVE STATEMENTS:                                       

                                                     

Sinus rhythm with 1st degree AV block

Possible Anterior infarct, age undetermined

ST & T wave abnormality, consider inferior ischemia

Abnormal ECG

No previous ECG available for comparison



Electronically Signed On 06-22-19 07:45:51 CDT by Milton Hoskins

## 2019-06-22 NOTE — RAD REPORT
EXAM DESCRIPTION:  RAD - Wrist Left 3 View - 6/21/2019 9:54 pm

 

CLINICAL HISTORY:  Slip and fall, left wrist pain

 

COMPARISON:  None.

 

FINDINGS:  No acute fracture is identifiable. There is evidence for old trauma changes to the distal 
radius and ulna. Old ulna styloid fracture without bony union is noted. Patient has severe degenerati
ve change at the scaphoid trapezial articulation and moderately severe degenerative change at the tra
pezium first metacarpal articulation. Scapholunate joint space widening noted. Triangular fibrocartil
age calcifications are present. No foreign body or other soft tissue abnormality.

 

IMPRESSION:  Advanced degenerative changes are present in the wrist as detailed.

 

No acute fracture change seen. Repeat imaging in 7 days recommended if the patient has continued symp
toms concerning for fracture.

## 2019-06-22 NOTE — RAD REPORT
EXAM DESCRIPTION:  RAD - Hip Left 2 View - 6/21/2019 9:54 pm

 

CLINICAL HISTORY:  Trip and fall, hip pain

 

COMPARISON:  None.

 

FINDINGS:  AP and frogleg views of the left hip were obtained. No fracture confirmed. No dislocation 
of the femoral head. No AVN or acute femoral head finding identified. Mild to moderate degenerative c
hanges are present along the superior aspect of the hip joint. Degenerative periarticular calcificati
ons are present. Dense arterial tree calcifications are seen. No pathologic bone process. No fracture
 of the partially imaged left hemipelvis.

 

No significant periarticular soft tissue finding.

 

 

IMPRESSION:  Left hip degenerative changes are present up to moderate in severity without fracture or
 acute finding seen.

 

Follow-up MR imaging or thin section CT imaging could be performed if the patient has continued sympt
oms concerning for fracture

## 2019-06-23 VITALS — SYSTOLIC BLOOD PRESSURE: 185 MMHG | DIASTOLIC BLOOD PRESSURE: 75 MMHG

## 2019-06-23 VITALS — TEMPERATURE: 98.1 F

## 2019-06-23 VITALS — OXYGEN SATURATION: 93 %

## 2019-06-23 RX ADMIN — Medication SCH ML: at 08:28

## 2019-06-23 RX ADMIN — PROPAFENONE HYDROCHLORIDE SCH MG: 150 TABLET, COATED ORAL at 08:27

## 2019-06-23 NOTE — CON
Date of Consultation:  06/23/2019



The patient was admitted on 06/21/2019 to Dr. Monroe's service.  I saw the patient on 06/23/2019.



Reason For Consultation:  Syncope.



History Of Present Illness:  Mr. Jurado is an 87-year-old white male, who basically has a past medical
 history of coronary artery disease status post PCI that was back in 1987 and he has a history of per
ipheral arterial disease, dyslipidemia, depression, glaucoma, atrial fibrillation, and hypertension. 
 Recently, he had a procedure for abdominal angiogram and had some bleeding in the groin area, remain
ed in the hospital for 11 days.  They put him on isosorbide mononitrate after that, I am assuming per
ipheral vascular disease.  No intervention was done.  He has recently had echocardiography and caroti
d Doppler in [facility] by Dr. Alex Walsh, both of which were negative.  He has been parveen
bhavesh on Lasix 40 mg b.i.d. and they had finasteride added to his regimen recently.  He came in with a 
syncopal episode, head trauma, left arm trauma, but no fractures, no bleeding.  Internally, as far as
 we know, all his x-ray and CT of his head have been negative so far.  He denied any symptoms _______
___.  Measurement of orthostatic blood pressures have been positive since he has been to the hospital
.  He denied any chest pain, nausea, vomiting, diaphoresis, PND, orthopnea, pedal edema, or palpitati
ons.



Past Medical History:  As stated above.



Allergies:  NONE.



Review of Systems:

Negative.



Social History:  Negative.



Family History:  Noncontributory.



Medications At Home:  Include iron, aspirin, Norvasc, Lipitor, Plavix, Lexapro, Lasix, finasteride, I
mdur, Protonix, Flomax, and Rythmol.



Physical Examination:

General:  He was in no acute distress, very pleasant, alert and oriented x3. 

Vital Signs:  Stable.  He was afebrile. 

HEENT:  Positive for hematomas across the preorbital area on the left side as well as his parietal an
d occipital region.  He had bruises all over his left arm. 

Neck:  Supple without any bruits, lymphadenopathy, JVD, or thyromegaly. 

Chest:  Clear to auscultation and percussion. 

Cardiac:  Revealed a regular rhythm and rate.  No murmurs, gallops, or rubs. 

Abdomen:  Benign. 

Extremities:  Revealed no clubbing, cyanosis, or edema. 

Neurologic:  He was intact.  He had poor pulses in the dorsalis pedis and posterior tibial. 

Skin:  Showed hematomas over the left periorbital area, left scalp, left arm.



Diagnostic Data:  His hemoglobin was 10.1.  Creatinine is 1.59.  Carotid Doppler was negative.  EKG s
howed possible old anterior MI.  X-rays of the pelvis and arm, and the chest, CT of the head, all of 
which were negative.



Impression And Plan:  

1.Syncope, secondary to orthostatic hypotension.  The patient takes Norvasc, Lasix, Lexapro, finaste
ride, and Flomax, and I think the combination of these made him hypotensive.  I think we can continue
 his Norvasc and Flomax and Lexapro, but I think we should take him off the finasteride and have him 
take Lasix only as needed.  He will weigh himself daily, watch his salt intake; and if his weight is 
stable and he does not have any edema, we can probably skip the Lasix.  He will follow up with Dr. Ralph nieves in the near future.

2.Anemia, on iron.

3.Hypertension, on Norvasc, stable.

4.Dyslipidemia, on Lipitor, stable.

5.History of coronary artery disease, status post stent, seems to be stable.  Has had testing by Dr. Walsh that were normal.

6.Depression.

7.Benign prostatic hypertrophy.

8.Gastroesophageal reflux disease.

9.Atrial fibrillation, in sinus rhythm, on Rythmol.

10.History of glaucoma.

11.Chronic renal insufficiency, stage 3, stable. 



I believe Mr. Jurado can go home from my standpoint with the above-mentioned instructions.  I do not t
hink we need to repeat any cardiac testing at this point.  The patient and his wife understand what emiliano quiroz discussed.  The case was discussed with Dr. Monroe.





NB/SILAS

DD:  06/23/2019 10:21:52Voice ID:  [id number]

DT:  06/23/2019 15:32:11Report ID:  [id number]

## 2019-06-23 NOTE — P.DS
Admission Date: 06/21/19


Discharge Date: 06/23/19


Disposition: ROUTINE DISCHARGE


Discharge Condition: FAIR


Reason for Admission: syncope, fall


Consultations: 





Cardiology 








- Problems


(1) Syncope


Current Visit: Yes   Status: Acute   


Qualifiers: 


   Syncope type: unspecified   Qualified Code(s): R55 - Syncope and collapse   





(2) Acute renal injury


Current Visit: Yes   Status: Acute   





(3) CAD (coronary artery disease)


Current Visit: Yes   Status: Chronic   


Qualifiers: 


   Coronary Disease-Associated Artery/Lesion type: native artery   Native vs. 

transplanted heart: native heart   Associated angina: without angina   

Qualified Code(s): I25.10 - Atherosclerotic heart disease of native coronary 

artery without angina pectoris   





(4) HTN (hypertension)


Current Visit: Yes   Status: Chronic   


Qualifiers: 


   Hypertension type: essential hypertension   Qualified Code(s): I10 - 

Essential (primary) hypertension   


Brief History of Present Illness: 





Mr Jurado is an 87 years old male with history of CAD, HTN, dyslipidemia, PVD, 

BPH, who [place] today. He was trying to get 

popcorn out of the cabinet, when suddenly he fell and hit his left side of the 

head to the ground. The patient denied dizziness or palpitation prior to fall. 

He did not loss his conscious, but he does not remember how did it happened. He 

denied chest pain or SOB. Wife and son said that he is not very steady in his 

feet, and he may tripped. EKG shows SR at 65 bpm, trop I negative. CT head/

cervical/chest/abd/pelvis showed no acute abnormalities. EKG SR at 65 with T 

wave inversion on inferior leads.


Hospital Course: 





Overall during the hospital stay patient remained stable





Patient was initially admitted to the hospital for syncope episode at home.  

Patient had extensive workup done here in the hospital along with lab work.  

Cardiology was consulted.  Patient had a head CT which was negative for any 

acute abnormality.  Patient was kept on cardiac tele which did not reveal any 

acute abnormality as well.  Cardiology saw the patient and recommended the 

patient can be observed in the hospital for 24 hr and then discharged home and 

thinks that his syncopal episode was most likely secondary to orthostatics 

hypotension.  Patient was recently started on finasteride and that could cause 

her to have low blood pressure and syncopal episode.  Patient was asked to stop 

taking the finasteride and take Lasix on p.r.n. basis only.  Patient then was 

asked to follow up with cardiology along with a primary care doctor outpatient.

  Due to the fact that patient had recent echocardiogram done repeated 1 that 

is not required here in the hospital.  Patient did not have any other 

neurological signs that warranted any brain MRI.  At this time and then patient 

was discharged home when he was able to ambulate and tolerate his diet well.


Vital Signs/Physical Exam: 














Temp Pulse Resp BP Pulse Ox


 


 98.1 F   71   17   185/75 H  93 


 


 06/23/19 08:00  06/23/19 08:26  06/23/19 08:00  06/23/19 08:26  06/23/19 08:00








General: Alert, In no apparent distress


HEENT: Atraumatic, PERRLA, EOMI


Neck: Supple, JVD not distended


Respiratory: Clear to auscultation bilaterally, Normal air movement


Cardiovascular: Regular rate/rhythm, Normal S1 S2


Gastrointestinal: Normal bowel sounds, No tenderness


Musculoskeletal: No tenderness


Integumentary: No rashes


Neurological: Normal speech, Normal tone, Normal affect


Lymphatics: No axilla or inguinal lymphadenopathy


Laboratory Data at Discharge: 














WBC  8.2 K/uL (4.3-10.9)   06/22/19  04:47    


 


Hgb  10.5 g/dL (13.6-17.9)  L D 06/22/19  04:47    


 


Hct  30.9 % (39.6-49.0)  L D 06/22/19  04:47    


 


Plt Count  180 K/uL (152-406)   06/22/19  04:47    


 


Sodium  140 mmol/L (136-145)   06/22/19  04:47    


 


Potassium  3.8 mmol/L (3.5-5.1)   06/22/19  04:47    


 


BUN  32 mg/dL (7-18)  H  06/22/19  04:47    


 


Creatinine  1.26 mg/dL (0.55-1.3)   06/22/19  04:47    


 


Glucose  117 mg/dL ()  H  06/22/19  04:47    








Home Medications: 








Amlodipine [Norvasc*] 1 tab PO DAILY 06/22/19 


Aspirin [Aspirin EC 81 MG] 1 tab PO DAILY 06/22/19 


Atorvastatin Calcium [Lipitor] 1 tab PO BEDTIME 06/22/19 


Brimonidine Tartrate/Timolol [Combigan 0.2%-0.5% Eye Drops] 2 drops EACH EYE 

TID 06/22/19 


Clopidogrel Bisulfate [Plavix*] 1 tab PO DAILY 06/22/19 


Escitalopram Oxalate [Lexapro] 1 tab PO DAILY 06/22/19 


Iron   65 mg PO DAILY 06/22/19 


Isosorbide Mononitrate [Isosorbide Mononitrate ER] 1 tab PO BEDTIME 06/22/19 


Multivitamin with Minerals [Multivitamins with Minerals] 1 tab PO DAILY 06/22/ 19 


Omega 3  1,000 mg PO BID 06/22/19 


Pantoprazole [Protonix Tab*] 1 tab PO DAILY 06/22/19 


Propafenone [Rythmol SR] 1 cap PO Q8H 06/22/19 


Tamsulosin [Flomax*] 1 tab PO DAILY 06/22/19 





Patient Discharge Instructions: Please F.u With PCP and Cardiology in 1 to 2 

week post discharge.  Stop taking medication.  Finstratide and Lasix.  Resume 

all other medication as provided


Diet: Regular


Activity: Ad maria luisa


Followup: 


Milton Hoskins MD [ACTIVE - CAN ADMIT] - 1 Week (call to schedule appointment)

## 2019-06-24 NOTE — RAD REPORT
EXAM DESCRIPTION:  CT - Head C Spine Cap Wo Con - 6/22/2019 1:26 am

 

CLINICAL HISTORY:  Trauma. Headache and left hip pain.

 

COMPARISON:  None.

 

TECHNIQUE:  1. CT scan of the brain and cervical spine without IV contrast.

2. CT scan of the chest, abdomen, and pelvis without IV contrast.

This exam was performed according to our departmental dose-optimization program, which includes autom
ated exposure control, adjustment of the mA and/or kV according to patient size and/or use of iterati
ve reconstruction technique.

 

FINDINGS:  BRAIN:

Diffuse involutional changes are present. There are scattered areas of hypoattenuation within the per
iventricular white matter, which likely represent chronic microvascular ischemia. No evidence of acut
e infarction, intracranial hemorrhage, extra-axial fluid collection, or midline shift. No air-fluid l
evels are seen in the paranasal sinuses to suggest acute sinusitis. No depressed skull fracture.

CERVICAL SPINE:

No acute cervical fracture or prevertebral soft tissue swelling. There is straightening of the normal
 cervical lordosis, which may be due to cervical collar, muscle spasm, or patient positioning. Multil
evel degenerative disc disease along with facet arthropathy is present. There are multilevel disc bul
ges but without advanced canal stenosis identified.

CHEST:

The thyroid gland is unremarkable. No mediastinal or axillary adenopathy. Limited evaluation for shanika
r adenopathy without IV contrast. The heart size is normal without pericardial effusion. No consolida
tion, pleural effusion, or pneumothorax.

ABDOMEN/PELVIS:

The liver, gallbladder, spleen, pancreas, and adrenal glands are normal. The pelvic organs are also n
ormal. The small and large bowel are unremarkable. Normal appendix. No intraperitoneal free fluid or 
free air.

Multiple bilateral renal cysts including a hyperdense cyst measuring 4.7 cm and the left kidney with 
surrounding inflammatory stranding, which may represent a partially ruptured left renal cyst. There i
s a small diverticulum arising off the left posterior urinary bladder. No pathologic body wall hernia
.

OTHER:

The aorta is normal caliber without dissection. Increased sclerosis of bilateral femoral heads, nonsp
ecific. No acute compression fracture of the thoracolumbar spine.

 

IMPRESSION:  1.   No acute intracranial hemorrhage or cervical fracture.

2.   No evidence of solid or hollow viscus injury.

3.   No acute fracture.

4.   Findings suggesting partially ruptured left renal cyst.

 

Electronically signed by:   Lorenzo Gonzalez MD   6/21/2019 10:40 PM CDT Workstation: [id number]

 

 

 

Due to temporary technical issues with the PACS/Fluency reporting system, reports are being signed by
 the in house radiologist as a courtesy to ensure prompt reporting. The interpreting radiologist is f
ully responsible for the content of the report.

## 2024-12-15 NOTE — RAD REPORT
EXAM DESCRIPTION:  RAD - Elbow Left 3 View - 6/21/2019 9:54 pm

 

CLINICAL HISTORY:  Trip and fall, elbow pain

 

COMPARISON:  None.

 

FINDINGS:  No gross fracture deformity seen. A small curvilinear bone density is seen adjacent to the
 radial head articular surface. While this is potentially a small bone avulsion, this is favored to b
e degenerative calcification along the joint capsule. Patient has additional degenerative calcificati
ons along the joint capsule. No elevation of the posterior fat pad. There is no dislocation or perios
teal reaction noted.  No foreign body or other soft tissue abnormality.

 

IMPRESSION:  Scattered degenerative changes are present in the left elbow joint. No acute fracture co
nfirmed. - Fluids: NS 100cc/hour  - Electrolytes: Will replete to maintain K>4, Phos>3, and Mag>2  - Nutrition: Regular diet,   - Activity: As tolerated, pending PT eval  - DVT Prophylaxis: N/A  - Stress Ulcer/GI Prophylaxis: N/A,  - Disposition: Admit to medicine, will return home with PT when medically cleared